# Patient Record
Sex: FEMALE | Race: WHITE | NOT HISPANIC OR LATINO | URBAN - METROPOLITAN AREA
[De-identification: names, ages, dates, MRNs, and addresses within clinical notes are randomized per-mention and may not be internally consistent; named-entity substitution may affect disease eponyms.]

---

## 2025-01-10 ENCOUNTER — OFFICE VISIT (OUTPATIENT)
Dept: URGENT CARE | Age: 70
End: 2025-01-10
Payer: COMMERCIAL

## 2025-01-10 VITALS
RESPIRATION RATE: 16 BRPM | SYSTOLIC BLOOD PRESSURE: 162 MMHG | BODY MASS INDEX: 36.61 KG/M2 | OXYGEN SATURATION: 99 % | WEIGHT: 220 LBS | HEART RATE: 78 BPM | DIASTOLIC BLOOD PRESSURE: 80 MMHG | TEMPERATURE: 97.6 F

## 2025-01-10 DIAGNOSIS — R60.9 3+ PITTING EDEMA: Primary | ICD-10-CM

## 2025-01-10 PROCEDURE — 1159F MED LIST DOCD IN RCRD: CPT | Performed by: FAMILY MEDICINE

## 2025-01-10 PROCEDURE — 1160F RVW MEDS BY RX/DR IN RCRD: CPT | Performed by: FAMILY MEDICINE

## 2025-01-10 PROCEDURE — 1036F TOBACCO NON-USER: CPT | Performed by: FAMILY MEDICINE

## 2025-01-10 PROCEDURE — 99213 OFFICE O/P EST LOW 20 MIN: CPT | Performed by: FAMILY MEDICINE

## 2025-01-10 RX ORDER — FLUTICASONE PROPIONATE 50 MCG
SPRAY, SUSPENSION (ML) NASAL
COMMUNITY
Start: 2023-03-17

## 2025-01-10 RX ORDER — TRIAMTERENE/HYDROCHLOROTHIAZID 37.5-25 MG
1 TABLET ORAL
COMMUNITY
Start: 2023-10-31

## 2025-01-10 ASSESSMENT — ENCOUNTER SYMPTOMS
CHILLS: 0
COUGH: 0
WEAKNESS: 0
MYALGIAS: 1
NUMBNESS: 0
WHEEZING: 0
WOUND: 0
CHEST TIGHTNESS: 0
FEVER: 0
SHORTNESS OF BREATH: 0

## 2025-01-10 NOTE — PROGRESS NOTES
Subjective   Patient ID: Amirah Arreola is a 69 y.o. female. They present today with a chief complaint of Rash (On Right leg, swelling, pain x 2 days. Ankle up to knee).    History of Present Illness  Patient denies any prior similar episodes.      History provided by:  Patient   used: No    Rash  This is a new problem. The current episode started in the past 7 days (2 days). The problem is unchanged. The affected locations include the right lower leg. The rash is characterized by swelling and itchiness. She was exposed to nothing. Pertinent negatives include no cough, fever or shortness of breath. Past treatments include nothing.       Past Medical History  Allergies as of 01/10/2025 - Reviewed 01/10/2025   Allergen Reaction Noted    Celecoxib Rash 01/27/2020    Clindamycin Rash 07/08/2020    Sulfa (sulfonamide antibiotics) Rash 01/10/2025       (Not in a hospital admission)       History reviewed. No pertinent past medical history.    History reviewed. No pertinent surgical history.     reports that she has never smoked. She has never used smokeless tobacco.    Review of Systems  Review of Systems   Constitutional:  Negative for chills and fever.   Respiratory:  Negative for cough, chest tightness, shortness of breath and wheezing.    Cardiovascular:  Negative for chest pain.   Musculoskeletal:  Positive for myalgias.        +3 pitting edema with a positive Ike sign.   Skin:  Positive for rash. Negative for wound.   Neurological:  Negative for weakness and numbness.                                  Objective    Vitals:    01/10/25 1809   BP: 162/80   Pulse: 78   Resp: 16   Temp: 36.4 °C (97.6 °F)   SpO2: 99%   Weight: 99.8 kg (220 lb)     No LMP recorded.    Physical Exam    Procedures    Point of Care Test & Imaging Results from this visit  No results found for this visit on 01/10/25.   No results found.    Diagnostic study results (if any) were reviewed by Michael Collins  .    Assessment/Plan   Allergies, medications, history, and pertinent labs/EKGs/Imaging reviewed by Michael Collins DO.     Orders and Diagnoses  There are no diagnoses linked to this encounter.    Medical Admin Record      Patient disposition: ED    Electronically signed by Michael Collins DO  6:22 PM

## 2025-02-01 ENCOUNTER — OFFICE VISIT (OUTPATIENT)
Dept: URGENT CARE | Age: 70
End: 2025-02-01
Payer: COMMERCIAL

## 2025-02-01 VITALS
SYSTOLIC BLOOD PRESSURE: 152 MMHG | OXYGEN SATURATION: 98 % | DIASTOLIC BLOOD PRESSURE: 82 MMHG | RESPIRATION RATE: 17 BRPM | HEART RATE: 89 BPM | TEMPERATURE: 98 F | BODY MASS INDEX: 34.95 KG/M2 | WEIGHT: 210 LBS

## 2025-02-01 DIAGNOSIS — Z20.822 CLOSE EXPOSURE TO COVID-19 VIRUS: ICD-10-CM

## 2025-02-01 DIAGNOSIS — J10.1 INFLUENZA A: Primary | ICD-10-CM

## 2025-02-01 DIAGNOSIS — R09.82 PND (POST-NASAL DRIP): ICD-10-CM

## 2025-02-01 DIAGNOSIS — R68.89 FLU-LIKE SYMPTOMS: ICD-10-CM

## 2025-02-01 DIAGNOSIS — H69.93 EUSTACHIAN TUBE DYSFUNCTION, BILATERAL: ICD-10-CM

## 2025-02-01 LAB
POC RAPID INFLUENZA A: POSITIVE
POC RAPID INFLUENZA B: NEGATIVE
POC RAPID STREP: NEGATIVE
POC SARS-COV-2 AG BINAX: NORMAL

## 2025-02-01 ASSESSMENT — ENCOUNTER SYMPTOMS
PSYCHIATRIC NEGATIVE: 1
SINUS PAIN: 0
SORE THROAT: 1
NEUROLOGICAL NEGATIVE: 1
EYES NEGATIVE: 1
GASTROINTESTINAL NEGATIVE: 1
SINUS PRESSURE: 0
VOMITING: 0
NAUSEA: 0
RHINORRHEA: 0
CHILLS: 0
FEVER: 0
FATIGUE: 1
COUGH: 1
APPETITE CHANGE: 1
WHEEZING: 0
SHORTNESS OF BREATH: 0
MUSCULOSKELETAL NEGATIVE: 1

## 2025-02-02 NOTE — PROGRESS NOTES
Subjective   Patient ID: Amirah Arreola is a 69 y.o. female. They present today with a chief complaint of Illness (Headache, sore throat, ear ache, cough x 5 days).    History of Present Illness  C/o mild cough, sore throat, and cold s/s x 6 days. Has tried OTC meds with mild relief. Patient denies any CP, SOB, HA, fever, abdominal pain, and nausea/vomiting otherwise.      History provided by:  Patient  Illness  Associated symptoms: cough, fatigue and sore throat    Associated symptoms: no chest pain, no ear pain, no fever, no nausea, no rhinorrhea, no shortness of breath, no vomiting and no wheezing        Past Medical History  Allergies as of 02/01/2025 - Reviewed 02/01/2025   Allergen Reaction Noted    Celecoxib Rash 01/27/2020    Clindamycin Rash 07/08/2020    Sulfa (sulfonamide antibiotics) Rash 01/10/2025       (Not in a hospital admission)       History reviewed. No pertinent past medical history.    History reviewed. No pertinent surgical history.     reports that she has never smoked. She has never used smokeless tobacco.    Review of Systems  Review of Systems   Constitutional:  Positive for appetite change and fatigue. Negative for chills and fever.   HENT:  Positive for postnasal drip and sore throat. Negative for ear discharge, ear pain, rhinorrhea, sinus pressure and sinus pain.    Eyes: Negative.    Respiratory:  Positive for cough. Negative for shortness of breath and wheezing.    Cardiovascular:  Negative for chest pain.   Gastrointestinal: Negative.  Negative for nausea and vomiting.   Musculoskeletal: Negative.    Skin: Negative.    Neurological: Negative.    Psychiatric/Behavioral: Negative.                                    Objective    Vitals:    02/01/25 1913   BP: 152/82   Pulse: 89   Resp: 17   Temp: 36.7 °C (98 °F)   SpO2: 98%   Weight: 95.3 kg (210 lb)     No LMP recorded.    Physical Exam  Vitals and nursing note reviewed.   Constitutional:       General: She is not in acute distress.      Appearance: Normal appearance. She is not ill-appearing, toxic-appearing or diaphoretic.   HENT:      Head: Normocephalic and atraumatic.      Right Ear: Tympanic membrane and ear canal normal.      Left Ear: Tympanic membrane and ear canal normal.      Nose: Nose normal. No rhinorrhea.      Mouth/Throat:      Mouth: Mucous membranes are moist.      Pharynx: Oropharynx is clear. Posterior oropharyngeal erythema present.   Eyes:      Extraocular Movements: Extraocular movements intact.      Pupils: Pupils are equal, round, and reactive to light.   Cardiovascular:      Rate and Rhythm: Normal rate and regular rhythm.      Pulses: Normal pulses.      Heart sounds: Normal heart sounds.   Pulmonary:      Effort: Pulmonary effort is normal. No respiratory distress.      Breath sounds: Normal breath sounds. No wheezing or rhonchi.   Abdominal:      General: Abdomen is flat. Bowel sounds are normal.      Palpations: Abdomen is soft.   Skin:     General: Skin is warm and dry.   Neurological:      Mental Status: She is alert and oriented to person, place, and time.   Psychiatric:         Mood and Affect: Mood normal.         Behavior: Behavior normal.         Procedures    Point of Care Test & Imaging Results from this visit  Results for orders placed or performed in visit on 02/01/25   POCT Influenza A/B manually resulted   Result Value Ref Range    POC Rapid Influenza A Positive (A) Negative    POC Rapid Influenza B Negative Negative   POCT Covid-19 Rapid Antigen   Result Value Ref Range    POC CARY-COV-2 AG  Presumptive negative test for SARS-CoV-2 (no antigen detected)     Presumptive negative test for SARS-CoV-2 (no antigen detected)   POCT rapid strep A manually resulted   Result Value Ref Range    POC Rapid Strep Negative Negative      No results found.    Diagnostic study results (if any) were reviewed by JAGRUTI Mijares.    Assessment/Plan   Allergies, medications, history, and pertinent labs/EKGs/Imaging  reviewed by JAGRUTI Mijares.     Medical Decision Making  Positive Influenza A. See results. Presentation consistent with and discussed viral etiology. Start home cetrizine and Flonase for PND and eustachian tube dysfunction. Discussed pushing fluids, rest, OTC symptoms management PRN. Patient verbalized understanding and agreed with the plan of care.      At time of discharge, patient was clinically well-appearing and appropriate for outpatient management. The patient/parent/guardian was educated regarding diagnosis, supportive care, OTC and Rx medications. The patient/parent/guardian was given the opportunity to ask questions prior to discharge. They verbalized understanding of discussion of treatment plan, expected course of illness and/or injury, indications on when to return to , when to seek further evaluation in ED/call 911, and the need to follow up with PCP and/or specialist as referred. Patient/parent/guardian was provided with work/school documentation if requested. Patient stable upon discharge.      Orders and Diagnoses  Diagnoses and all orders for this visit:  Influenza A  Eustachian tube dysfunction, bilateral  PND (post-nasal drip)  Close exposure to COVID-19 virus  -     POCT Covid-19 Rapid Antigen  Flu-like symptoms  -     POCT Influenza A/B manually resulted  -     POCT rapid strep A manually resulted      Medical Admin Record      Patient disposition: Home    Electronically signed by JAGRUTI Mijares  7:46 PM